# Patient Record
Sex: FEMALE | Race: OTHER | NOT HISPANIC OR LATINO | URBAN - METROPOLITAN AREA
[De-identification: names, ages, dates, MRNs, and addresses within clinical notes are randomized per-mention and may not be internally consistent; named-entity substitution may affect disease eponyms.]

---

## 2021-08-28 ENCOUNTER — TELEPHONE ENCOUNTER (OUTPATIENT)
Dept: URBAN - METROPOLITAN AREA CLINIC 13 | Facility: CLINIC | Age: 51
End: 2021-08-28

## 2021-08-29 ENCOUNTER — TELEPHONE ENCOUNTER (OUTPATIENT)
Dept: URBAN - METROPOLITAN AREA CLINIC 13 | Facility: CLINIC | Age: 51
End: 2021-08-29

## 2023-09-05 ENCOUNTER — OFFICE VISIT (OUTPATIENT)
Dept: URBAN - METROPOLITAN AREA CLINIC 84 | Facility: CLINIC | Age: 53
End: 2023-09-05

## 2023-09-05 RX ORDER — LIRAGLUTIDE 6 MG/ML
INJECTION, SOLUTION SUBCUTANEOUS
Qty: 15 MILLILITER | Status: ACTIVE | COMMUNITY

## 2023-09-05 RX ORDER — LEVOTHYROXINE SODIUM 175 UG/1
TAKE 1 TABLET BY MOUTH ONCE DAILY TABLET ORAL
Qty: 90 EACH | Refills: 0 | Status: ACTIVE | COMMUNITY

## 2023-09-05 RX ORDER — TIRZEPATIDE 7.5 MG/.5ML
INJECT 1 SYRINGE SUBCUTANEOUSLY ONCE A WEEK INJECTION, SOLUTION SUBCUTANEOUS
Qty: 4 MILLILITER | Refills: 0 | Status: ACTIVE | COMMUNITY

## 2023-09-05 RX ORDER — ROSUVASTATIN CALCIUM 10 MG/1
TABLET, FILM COATED ORAL
Qty: 90 TABLET | Status: ACTIVE | COMMUNITY

## 2023-09-29 ENCOUNTER — OFFICE VISIT (OUTPATIENT)
Dept: URBAN - METROPOLITAN AREA CLINIC 84 | Facility: CLINIC | Age: 53
End: 2023-09-29
Payer: COMMERCIAL

## 2023-09-29 ENCOUNTER — WEB ENCOUNTER (OUTPATIENT)
Dept: URBAN - METROPOLITAN AREA CLINIC 84 | Facility: CLINIC | Age: 53
End: 2023-09-29

## 2023-09-29 ENCOUNTER — DASHBOARD ENCOUNTERS (OUTPATIENT)
Age: 53
End: 2023-09-29

## 2023-09-29 VITALS
WEIGHT: 182.2 LBS | HEIGHT: 61 IN | TEMPERATURE: 97.2 F | BODY MASS INDEX: 34.4 KG/M2 | DIASTOLIC BLOOD PRESSURE: 74 MMHG | SYSTOLIC BLOOD PRESSURE: 123 MMHG | HEART RATE: 80 BPM

## 2023-09-29 DIAGNOSIS — R13.14 PHARYNGOESOPHAGEAL DYSPHAGIA: ICD-10-CM

## 2023-09-29 DIAGNOSIS — R10.13 DYSPEPSIA: ICD-10-CM

## 2023-09-29 DIAGNOSIS — K64.8 INTERNAL HEMORRHOID, BLEEDING: ICD-10-CM

## 2023-09-29 DIAGNOSIS — Z12.11 SCREENING COLONOSCOPY: ICD-10-CM

## 2023-09-29 PROCEDURE — 99204 OFFICE O/P NEW MOD 45 MIN: CPT | Performed by: INTERNAL MEDICINE

## 2023-09-29 RX ORDER — ROSUVASTATIN CALCIUM 10 MG/1
TABLET, FILM COATED ORAL
Qty: 90 TABLET | Status: ACTIVE | COMMUNITY

## 2023-09-29 RX ORDER — LEVOTHYROXINE SODIUM 175 UG/1
TAKE 1 TABLET BY MOUTH ONCE DAILY TABLET ORAL
Qty: 90 EACH | Refills: 0 | Status: ACTIVE | COMMUNITY

## 2023-09-29 RX ORDER — TIRZEPATIDE 7.5 MG/.5ML
INJECT 1 SYRINGE SUBCUTANEOUSLY ONCE A WEEK INJECTION, SOLUTION SUBCUTANEOUS
Qty: 4 MILLILITER | Refills: 0 | Status: ACTIVE | COMMUNITY

## 2023-09-29 RX ORDER — POLYETHYLENE GLYCOL 3350, SODIUM SULFATE, SODIUM CHLORIDE, POTASSIUM CHLORIDE, ASCORBIC ACID, SODIUM ASCORBATE 140-9-5.2G
AS DIRECTED KIT ORAL AS DIRECTED
Qty: 1 BOX | Refills: 0 | OUTPATIENT
Start: 2023-09-29 | End: 2023-09-30

## 2023-09-29 RX ORDER — LIRAGLUTIDE 6 MG/ML
INJECTION, SOLUTION SUBCUTANEOUS
Qty: 15 MILLILITER | Status: ACTIVE | COMMUNITY

## 2023-09-29 NOTE — HPI-TODAY'S VISIT:
This patient was referred by Dr. Kyara Veronica for an evaluation of blood in stool.  A copy of this will be sent to the referring provider.  About a month ago she developed BRBPR.  It was on the tissue and in the toilet.  There was no associated strain or diarrhea.  There was no associated rectal pain.  SHe does feel protruding hemorrhoids.  In general she has regular BM's.  She denies abdominal pain. She denies anroexia or weight loss.  She has daily nausea for many years.  She denies vomiting.  She has intermittent GERD.  She has dysphagia for many years and it has been getting worse.  It is only to solids.  Her mother had colon polyps.  She has not had prior colonoscopy or EGD.  She ahs had prior extensive thyroid surgery

## 2023-10-16 ENCOUNTER — OFFICE VISIT (OUTPATIENT)
Dept: URBAN - METROPOLITAN AREA SURGERY CENTER 20 | Facility: SURGERY CENTER | Age: 53
End: 2023-10-16

## 2023-10-31 ENCOUNTER — TELEPHONE ENCOUNTER (OUTPATIENT)
Dept: URBAN - METROPOLITAN AREA CLINIC 84 | Facility: CLINIC | Age: 53
End: 2023-10-31

## 2023-11-06 ENCOUNTER — OFFICE VISIT (OUTPATIENT)
Dept: URBAN - METROPOLITAN AREA SURGERY CENTER 20 | Facility: SURGERY CENTER | Age: 53
End: 2023-11-06

## 2023-11-27 ENCOUNTER — CLAIMS CREATED FROM THE CLAIM WINDOW (OUTPATIENT)
Dept: URBAN - METROPOLITAN AREA SURGERY CENTER 20 | Facility: SURGERY CENTER | Age: 53
End: 2023-11-27
Payer: COMMERCIAL

## 2023-11-27 ENCOUNTER — TELEPHONE ENCOUNTER (OUTPATIENT)
Dept: URBAN - METROPOLITAN AREA CLINIC 84 | Facility: CLINIC | Age: 53
End: 2023-11-27

## 2023-11-27 DIAGNOSIS — Z12.11 COLON CANCER SCREENING: ICD-10-CM

## 2023-11-27 DIAGNOSIS — K64.8 OTHER HEMORRHOIDS: ICD-10-CM

## 2023-11-27 DIAGNOSIS — K64.1 BLEEDING GRADE II HEMORRHOIDS: ICD-10-CM

## 2023-11-27 PROCEDURE — 46221 LIGATION OF HEMORRHOID(S): CPT | Performed by: INTERNAL MEDICINE

## 2023-11-27 PROCEDURE — G8907 PT DOC NO EVENTS ON DISCHARG: HCPCS | Performed by: INTERNAL MEDICINE

## 2023-11-27 PROCEDURE — 45378 DIAGNOSTIC COLONOSCOPY: CPT | Performed by: INTERNAL MEDICINE

## 2023-11-27 PROCEDURE — 00811 ANES LWR INTST NDSC NOS: CPT | Performed by: NURSE ANESTHETIST, CERTIFIED REGISTERED

## 2023-11-27 RX ORDER — ROSUVASTATIN CALCIUM 10 MG/1
TABLET, FILM COATED ORAL
Qty: 90 TABLET | Status: ACTIVE | COMMUNITY

## 2023-11-27 RX ORDER — TIRZEPATIDE 7.5 MG/.5ML
INJECT 1 SYRINGE SUBCUTANEOUSLY ONCE A WEEK INJECTION, SOLUTION SUBCUTANEOUS
Qty: 4 MILLILITER | Refills: 0 | Status: ACTIVE | COMMUNITY

## 2023-11-27 RX ORDER — LIRAGLUTIDE 6 MG/ML
INJECTION, SOLUTION SUBCUTANEOUS
Qty: 15 MILLILITER | Status: ACTIVE | COMMUNITY

## 2023-11-27 RX ORDER — LEVOTHYROXINE SODIUM 175 UG/1
TAKE 1 TABLET BY MOUTH ONCE DAILY TABLET ORAL
Qty: 90 EACH | Refills: 0 | Status: ACTIVE | COMMUNITY

## 2025-07-17 ENCOUNTER — OFFICE VISIT (OUTPATIENT)
Dept: URBAN - METROPOLITAN AREA CLINIC 84 | Facility: CLINIC | Age: 55
End: 2025-07-17

## 2025-07-17 RX ORDER — ROSUVASTATIN CALCIUM 10 MG/1
TABLET, FILM COATED ORAL
Qty: 90 TABLET | Status: ACTIVE | COMMUNITY

## 2025-07-17 RX ORDER — LEVOTHYROXINE SODIUM 175 UG/1
TAKE 1 TABLET BY MOUTH ONCE DAILY TABLET ORAL
Qty: 90 EACH | Refills: 0 | Status: ACTIVE | COMMUNITY

## 2025-07-17 RX ORDER — LIRAGLUTIDE 6 MG/ML
INJECTION, SOLUTION SUBCUTANEOUS
Qty: 15 MILLILITER | Status: ACTIVE | COMMUNITY

## 2025-07-17 RX ORDER — TIRZEPATIDE 7.5 MG/.5ML
INJECT 1 SYRINGE SUBCUTANEOUSLY ONCE A WEEK INJECTION, SOLUTION SUBCUTANEOUS
Qty: 4 MILLILITER | Refills: 0 | Status: ACTIVE | COMMUNITY